# Patient Record
Sex: MALE | URBAN - METROPOLITAN AREA
[De-identification: names, ages, dates, MRNs, and addresses within clinical notes are randomized per-mention and may not be internally consistent; named-entity substitution may affect disease eponyms.]

---

## 2018-12-19 ENCOUNTER — APPOINTMENT (RX ONLY)
Dept: URBAN - METROPOLITAN AREA CLINIC 141 | Facility: CLINIC | Age: 73
Setting detail: DERMATOLOGY
End: 2018-12-19

## 2018-12-19 DIAGNOSIS — L57.8 OTHER SKIN CHANGES DUE TO CHRONIC EXPOSURE TO NONIONIZING RADIATION: ICD-10-CM

## 2018-12-19 DIAGNOSIS — D18.0 HEMANGIOMA: ICD-10-CM

## 2018-12-19 DIAGNOSIS — L81.4 OTHER MELANIN HYPERPIGMENTATION: ICD-10-CM

## 2018-12-19 DIAGNOSIS — L57.0 ACTINIC KERATOSIS: ICD-10-CM

## 2018-12-19 DIAGNOSIS — D22 MELANOCYTIC NEVI: ICD-10-CM

## 2018-12-19 DIAGNOSIS — L82.1 OTHER SEBORRHEIC KERATOSIS: ICD-10-CM

## 2018-12-19 PROBLEM — D22.5 MELANOCYTIC NEVI OF TRUNK: Status: ACTIVE | Noted: 2018-12-19

## 2018-12-19 PROBLEM — D18.01 HEMANGIOMA OF SKIN AND SUBCUTANEOUS TISSUE: Status: ACTIVE | Noted: 2018-12-19

## 2018-12-19 PROBLEM — Z85.820 PERSONAL HISTORY OF MALIGNANT MELANOMA OF SKIN: Status: ACTIVE | Noted: 2018-12-19

## 2018-12-19 PROCEDURE — ? LIQUID NITROGEN

## 2018-12-19 PROCEDURE — 17000 DESTRUCT PREMALG LESION: CPT

## 2018-12-19 PROCEDURE — ? COUNSELING

## 2018-12-19 PROCEDURE — 17003 DESTRUCT PREMALG LES 2-14: CPT

## 2018-12-19 PROCEDURE — 99203 OFFICE O/P NEW LOW 30 MIN: CPT | Mod: 25

## 2018-12-19 ASSESSMENT — LOCATION ZONE DERM
LOCATION ZONE: SCALP
LOCATION ZONE: ARM
LOCATION ZONE: TRUNK

## 2018-12-19 ASSESSMENT — LOCATION DETAILED DESCRIPTION DERM
LOCATION DETAILED: LEFT PROXIMAL DORSAL FOREARM
LOCATION DETAILED: INFERIOR THORACIC SPINE
LOCATION DETAILED: RIGHT LATERAL DORSAL WRIST
LOCATION DETAILED: MID-OCCIPITAL SCALP
LOCATION DETAILED: LEFT DISTAL DORSAL FOREARM
LOCATION DETAILED: LEFT MEDIAL UPPER BACK
LOCATION DETAILED: LEFT SUPERIOR MEDIAL UPPER BACK
LOCATION DETAILED: RIGHT MEDIAL UPPER BACK
LOCATION DETAILED: SUPERIOR THORACIC SPINE

## 2018-12-19 ASSESSMENT — LOCATION SIMPLE DESCRIPTION DERM
LOCATION SIMPLE: LEFT UPPER BACK
LOCATION SIMPLE: LEFT FOREARM
LOCATION SIMPLE: RIGHT UPPER BACK
LOCATION SIMPLE: POSTERIOR SCALP
LOCATION SIMPLE: UPPER BACK
LOCATION SIMPLE: RIGHT WRIST

## 2018-12-19 NOTE — PROCEDURE: MIPS QUALITY
Quality 431: Preventive Care And Screening: Unhealthy Alcohol Use - Screening: Patient screened for unhealthy alcohol use using a single question and scores less than 2 times per year
Quality 154 Part B: Falls: Risk Screening (Should Be Reported With Measure 155.): Patient screened for future fall risk; documentation of no falls in the past year or only one fall without injury in the past year
Quality 154 Part A: Falls: Risk Assessment (Should Be Reported With Measure 155.): Falls risk assessment completed and documented in the past 12 months.
Quality 111:Pneumonia Vaccination Status For Older Adults: Pneumococcal Vaccination Previously Received
Quality 131: Pain Assessment And Follow-Up: Pain assessment using a standardized tool is documented as negative, no follow-up plan required
Quality 265: Biopsy Follow-Up: Biopsy results reviewed, communicated, tracked, and documented
Quality 155 (Denominator): Falls Plan Of Care: Plan of Care not Documented, Reason not Otherwise Specified
Quality 110: Preventive Care And Screening: Influenza Immunization: Influenza Immunization Administered during Influenza season
Quality 47: Advance Care Plan: Advance Care Planning discussed and documented in the medical record; patient did not wish or was not able to name a surrogate decision maker or provide an advance care plan.
Detail Level: Detailed

## 2019-05-23 ENCOUNTER — APPOINTMENT (RX ONLY)
Dept: URBAN - METROPOLITAN AREA CLINIC 141 | Facility: CLINIC | Age: 74
Setting detail: DERMATOLOGY
End: 2019-05-23

## 2019-05-23 DIAGNOSIS — L21.8 OTHER SEBORRHEIC DERMATITIS: ICD-10-CM

## 2019-05-23 DIAGNOSIS — L57.0 ACTINIC KERATOSIS: ICD-10-CM

## 2019-05-23 PROCEDURE — 99213 OFFICE O/P EST LOW 20 MIN: CPT | Mod: 25

## 2019-05-23 PROCEDURE — 17000 DESTRUCT PREMALG LESION: CPT

## 2019-05-23 PROCEDURE — ? LIQUID NITROGEN

## 2019-05-23 PROCEDURE — ? COUNSELING

## 2019-05-23 PROCEDURE — ? PRESCRIPTION

## 2019-05-23 PROCEDURE — 17003 DESTRUCT PREMALG LES 2-14: CPT

## 2019-05-23 RX ORDER — FLUOCINONIDE 0.5 MG/ML
SOLUTION TOPICAL
Qty: 1 | Refills: 5 | Status: ERX | COMMUNITY
Start: 2019-05-23

## 2019-05-23 RX ADMIN — FLUOCINONIDE: 0.5 SOLUTION TOPICAL at 17:44

## 2019-05-23 ASSESSMENT — LOCATION DETAILED DESCRIPTION DERM
LOCATION DETAILED: NASAL TIP
LOCATION DETAILED: NASAL ROOT
LOCATION DETAILED: LEFT SUPERIOR PARIETAL SCALP
LOCATION DETAILED: RIGHT DISTAL RADIAL DORSAL FOREARM

## 2019-05-23 ASSESSMENT — LOCATION SIMPLE DESCRIPTION DERM
LOCATION SIMPLE: SCALP
LOCATION SIMPLE: NOSE
LOCATION SIMPLE: RIGHT FOREARM

## 2019-05-23 ASSESSMENT — LOCATION ZONE DERM
LOCATION ZONE: SCALP
LOCATION ZONE: NOSE
LOCATION ZONE: ARM

## 2019-05-23 NOTE — PROCEDURE: MIPS QUALITY
Quality 111:Pneumonia Vaccination Status For Older Adults: Pneumococcal Vaccination Previously Received
Quality 265: Biopsy Follow-Up: Biopsy results reviewed, communicated, tracked, and documented
Quality 155 (Denominator): Falls Plan Of Care: Plan of Care not Documented, Reason not Otherwise Specified
Quality 110: Preventive Care And Screening: Influenza Immunization: Influenza Immunization Administered during Influenza season
Quality 154 Part A: Falls: Risk Assessment (Should Be Reported With Measure 155.): Falls risk assessment completed and documented in the past 12 months.
Quality 154 Part B: Falls: Risk Screening (Should Be Reported With Measure 155.): Patient screened for future fall risk; documentation of no falls in the past year or only one fall without injury in the past year
Quality 131: Pain Assessment And Follow-Up: Pain assessment using a standardized tool is documented as negative, no follow-up plan required
Quality 47: Advance Care Plan: Advance Care Planning discussed and documented in the medical record; patient did not wish or was not able to name a surrogate decision maker or provide an advance care plan.
Quality 431: Preventive Care And Screening: Unhealthy Alcohol Use - Screening: Patient screened for unhealthy alcohol use using a single question and scores less than 2 times per year
Detail Level: Detailed

## 2021-12-01 ENCOUNTER — OFFICE VISIT (OUTPATIENT)
Dept: URBAN - METROPOLITAN AREA CLINIC 51 | Facility: CLINIC | Age: 76
End: 2021-12-01
Payer: MEDICARE

## 2021-12-01 DIAGNOSIS — H04.123 TEAR FILM INSUFFICIENCY OF BILATERAL LACRIMAL GLANDS: Primary | ICD-10-CM

## 2021-12-01 DIAGNOSIS — H52.4 PRESBYOPIA: ICD-10-CM

## 2021-12-01 DIAGNOSIS — H43.813 VITREOUS DEGENERATION, BILATERAL: ICD-10-CM

## 2021-12-01 DIAGNOSIS — H02.831 DERMATOCHALASIS OF RIGHT UPPER EYELID: ICD-10-CM

## 2021-12-01 DIAGNOSIS — H02.834 DERMATOCHALASIS OF LEFT UPPER EYELID: ICD-10-CM

## 2021-12-01 DIAGNOSIS — H31.092 CHORIORETINAL SCARS, LEFT EYE: ICD-10-CM

## 2021-12-01 DIAGNOSIS — H25.13 AGE-RELATED NUCLEAR CATARACT, BILATERAL: ICD-10-CM

## 2021-12-01 PROCEDURE — 92134 CPTRZ OPH DX IMG PST SGM RTA: CPT | Performed by: OPTOMETRIST

## 2021-12-01 PROCEDURE — 92004 COMPRE OPH EXAM NEW PT 1/>: CPT | Performed by: OPTOMETRIST

## 2021-12-01 ASSESSMENT — KERATOMETRY
OS: 45.27
OD: 45.34

## 2021-12-01 ASSESSMENT — VISUAL ACUITY
OD: 20/20
OS: 20/20

## 2021-12-01 ASSESSMENT — INTRAOCULAR PRESSURE
OD: 17
OS: 17

## 2021-12-01 NOTE — IMPRESSION/PLAN
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
*Patient has insomnia Plan: Explained to the patient there is three important tear film layers and if there is a breakdown if any of the layers, dry eyes is the cause of their symptoms. Reviewed this condition has no cure and dry eye treatment is focused on managing symptoms. Recommend patient to use Optase Moist Heat Mask BID ~ 10 minutes, Tea Tree Oil Eyelid Cleansing Wipes applied to lid margins/lashes BID and Dry Eye Intense Drops PF ATs 1gtt QID or more when needed. Disp coupon. 
BETTER sleep

## 2021-12-01 NOTE — IMPRESSION/PLAN
Impression: Chorioretinal scars, left eye: H31.092. Plan: Warning signs of retinal tear and detachment discussed with patient. To return to clinic STAT if any change in symptoms consistent with RT or RD.

## 2022-12-14 ENCOUNTER — OFFICE VISIT (OUTPATIENT)
Dept: URBAN - METROPOLITAN AREA CLINIC 43 | Facility: CLINIC | Age: 77
End: 2022-12-14
Payer: MEDICARE

## 2022-12-14 DIAGNOSIS — H02.132 SENILE ECTROPION OF RIGHT LOWER EYELID: Primary | ICD-10-CM

## 2022-12-14 PROCEDURE — 92285 EXTERNAL OCULAR PHOTOGRAPHY: CPT | Performed by: STUDENT IN AN ORGANIZED HEALTH CARE EDUCATION/TRAINING PROGRAM

## 2022-12-14 PROCEDURE — 99204 OFFICE O/P NEW MOD 45 MIN: CPT | Performed by: STUDENT IN AN ORGANIZED HEALTH CARE EDUCATION/TRAINING PROGRAM

## 2022-12-14 RX ORDER — ERYTHROMYCIN 5 MG/G
OINTMENT OPHTHALMIC
Qty: 2 | Refills: 0 | Status: ACTIVE
Start: 2022-12-14

## 2022-12-14 NOTE — IMPRESSION/PLAN
Impression: Senile ectropion of right lower eyelid: H02.132. Plan: The patient demonstrates tarsal ectropion along with midface descent and negative malar vector. This pathology is causing exposure keratoconjunctivitis, FB sensation, tearing, and ocular irritation. To resolve this, I recommended lateral tarsal strip, conjplasty w/ rearrangement, ATR<10cm eyelid to achieve myocutaneous elevation of the midface orbicularis/SOOF to support the lower eyelid position, and prevent hammocking of the lid beneath the globe given negative malar vector, and ectropion repair w/ suture to replace the tarsus in physiologic position, and protect the globe. Risk and benefits discussed with patient and all questions were answered. RLL ectropion. LLL laxity. Symptoms controlled with artificial tears on the left. Patient understands there will be some asymmetry between the right and left sides and the left lower eyelid may ultimately require surgery.

## 2023-03-27 ENCOUNTER — ADULT PHYSICAL (OUTPATIENT)
Dept: URBAN - METROPOLITAN AREA CLINIC 44 | Facility: CLINIC | Age: 78
End: 2023-03-27
Payer: COMMERCIAL

## 2023-03-27 DIAGNOSIS — H02.132 SENILE ECTROPION OF RIGHT LOWER EYELID: ICD-10-CM

## 2023-03-27 DIAGNOSIS — Z01.818 ENCOUNTER FOR OTHER PREPROCEDURAL EXAMINATION: Primary | ICD-10-CM

## 2023-03-27 PROCEDURE — 99203 OFFICE O/P NEW LOW 30 MIN: CPT | Performed by: PHYSICIAN ASSISTANT

## 2023-05-03 ENCOUNTER — POST-OPERATIVE VISIT (OUTPATIENT)
Dept: URBAN - METROPOLITAN AREA CLINIC 44 | Facility: CLINIC | Age: 78
End: 2023-05-03
Payer: COMMERCIAL

## 2023-05-03 DIAGNOSIS — Z48.89 ENCOUNTER FOR OTHER SPECIFIED SURGICAL AFTERCARE: Primary | ICD-10-CM

## 2023-05-03 PROCEDURE — 99024 POSTOP FOLLOW-UP VISIT: CPT | Performed by: STUDENT IN AN ORGANIZED HEALTH CARE EDUCATION/TRAINING PROGRAM

## 2023-05-03 NOTE — IMPRESSION/PLAN
Impression: S/P Ectropion Repair/ LTS- RLL OD - 16 Days. Encounter for other specified surgical aftercare  Z48.89. Plan: Patient healing well, lid in great position, patient pleased. No signs of infection, sutures will dissolve. Discussed excess skin due to hooding from brow ptosis/dermatochalasis, likely worsened by post op swelling at lateral canthus, should improve to pre-op state in next few weeks. RTC PRN. Recommend yearly dilated exams with optometry.

## 2023-05-19 ENCOUNTER — OFFICE VISIT (OUTPATIENT)
Dept: URBAN - METROPOLITAN AREA CLINIC 44 | Facility: CLINIC | Age: 78
End: 2023-05-19
Payer: COMMERCIAL

## 2023-05-19 DIAGNOSIS — H02.32 BLEPHAROCHALASIS OF RIGHT LOWER LID: ICD-10-CM

## 2023-05-19 DIAGNOSIS — H04.123 DRY EYE SYNDROME OF BILATERAL LACRIMAL GLANDS: ICD-10-CM

## 2023-05-19 DIAGNOSIS — H02.34 BLEPHAROCHALASIS OF LEFT UPPER LID: ICD-10-CM

## 2023-05-19 DIAGNOSIS — H25.13 AGE-RELATED NUCLEAR CATARACT, BILATERAL: Primary | ICD-10-CM

## 2023-05-19 PROCEDURE — 92014 COMPRE OPH EXAM EST PT 1/>: CPT | Performed by: OPTOMETRIST

## 2023-05-19 ASSESSMENT — INTRAOCULAR PRESSURE
OD: 20
OS: 18

## 2023-05-19 ASSESSMENT — VISUAL ACUITY
OD: 20/20
OS: 20/20

## 2023-05-19 ASSESSMENT — KERATOMETRY
OS: 45.25
OD: 45.25

## 2023-05-19 NOTE — IMPRESSION/PLAN
Impression: Blepharochalasis of right lower lid: H02.32. Plan: lid hygiene and AFTs.   RTC if worsens